# Patient Record
Sex: MALE | Race: WHITE | NOT HISPANIC OR LATINO | Employment: FULL TIME | URBAN - METROPOLITAN AREA
[De-identification: names, ages, dates, MRNs, and addresses within clinical notes are randomized per-mention and may not be internally consistent; named-entity substitution may affect disease eponyms.]

---

## 2024-10-01 ENCOUNTER — OFFICE VISIT (OUTPATIENT)
Dept: URGENT CARE | Age: 54
End: 2024-10-01
Payer: COMMERCIAL

## 2024-10-01 VITALS
HEART RATE: 65 BPM | TEMPERATURE: 98.2 F | SYSTOLIC BLOOD PRESSURE: 152 MMHG | RESPIRATION RATE: 16 BRPM | OXYGEN SATURATION: 98 % | DIASTOLIC BLOOD PRESSURE: 66 MMHG

## 2024-10-01 DIAGNOSIS — K04.7 DENTAL INFECTION: Primary | ICD-10-CM

## 2024-10-01 RX ORDER — CLINDAMYCIN HYDROCHLORIDE 300 MG/1
300 CAPSULE ORAL 3 TIMES DAILY
Qty: 21 CAPSULE | Refills: 0 | Status: SHIPPED | OUTPATIENT
Start: 2024-10-01 | End: 2024-10-08

## 2024-10-01 NOTE — PROGRESS NOTES
Subjective   Patient ID: Donte Grant is a 54 y.o. male. They present today with a chief complaint of Dental Pain (Dental pain since Saturday, concerned for infected wisdom tooth).    History of Present Illness  55 yo male coming in for dental pain to the right upper tooth. He states he needs to have the tooth removed but doesn't go for the consult until Oct. 17th. He states he is having throbbing pain and the gums are swollen. He denies any fevers or chills.     Past Medical History  Allergies as of 10/01/2024 - Reviewed 10/01/2024   Allergen Reaction Noted    Penicillins Other 10/01/2024       (Not in a hospital admission)       History reviewed. No pertinent past medical history.    History reviewed. No pertinent surgical history.         Review of Systems  Review of Systems:  General: No weight loss, fatigue, anorexia, insomnia, fever, chills.  ENT: No pharyngitis, dry mouth, nasal congestion, ear pain. Positive right upper tooth pain  Cardiac: No chest pain, palpitations, syncope, near syncope.  Pulmonary:  No shortness of breath, cough, hemoptysis  Heme/lymph: No swollen glands, fever, bleeding  Musculoskeletal: No limb pain, joint pain, joint swelling.  Skin: No rashes  Neuro: No numbness, tingling, headaches                                 Objective    Vitals:    10/01/24 1541   BP: 152/66   Pulse: 65   Resp: 16   Temp: 36.8 °C (98.2 °F)   SpO2: 98%     No LMP for male patient.    Physical Exam  ENT:  General: Vitals noted, no distress, afebrile. Normal phonation, no stridor, no trismus  ENT:  Tooth number 3 is noted to be broken off with erythema and edema to the gums surrounding the tooth. Posterior oropharynx without erythema, exudate, or swelling. Uvula is in the midline and non-edematous.  Neck: Supple. No meningismus through full range of motion. No lymphadenopathy.   Cardiac: Regular rate and rhythm, no murmur.  Lungs: Good aeration throughout. No adventitious breath sounds.   Skin: No rash  Neuro:  No focal neurologic deficits. NIH score is 0.      Procedures    Point of Care Test & Imaging Results from this visit  No results found for this visit on 10/01/24.   No results found.    Diagnostic study results (if any) were reviewed by HECTOR Pineda.    Assessment/Plan   Allergies, medications, history, and pertinent labs/EKGs/Imaging reviewed by HECTOR Pineda.     Medical Decision Making  Treatment: Clindamycin prescribed  Differential: 1) dental infection, 2) dental abscess, 3) dental pain   Plan: Discussed differential with the pateint. Patient will follow up with the PCP in the next 2-3 days. Return for any worsening symptoms or go to the ER for further evaluation. Patient understands return precautions and discharege insturctions.  Impression:   1) dental infection      Orders and Diagnoses  Diagnoses and all orders for this visit:  Dental infection  -     clindamycin (Cleocin HCL) 300 mg capsule; Take 1 capsule (300 mg) by mouth 3 times a day for 7 days.      Medical Admin Record      Patient disposition: Home    Electronically signed by HECTOR Pineda  3:57 PM

## 2024-10-21 ENCOUNTER — OFFICE VISIT (OUTPATIENT)
Dept: URGENT CARE | Age: 54
End: 2024-10-21
Payer: COMMERCIAL

## 2024-10-21 VITALS
SYSTOLIC BLOOD PRESSURE: 136 MMHG | TEMPERATURE: 97.1 F | RESPIRATION RATE: 17 BRPM | OXYGEN SATURATION: 97 % | HEART RATE: 69 BPM | DIASTOLIC BLOOD PRESSURE: 86 MMHG

## 2024-10-21 DIAGNOSIS — K04.7 TOOTH INFECTION: Primary | ICD-10-CM

## 2024-10-21 PROCEDURE — 99214 OFFICE O/P EST MOD 30 MIN: CPT

## 2024-10-21 RX ORDER — DOXYCYCLINE 100 MG/1
100 CAPSULE ORAL 2 TIMES DAILY
Qty: 20 CAPSULE | Refills: 0 | Status: SHIPPED | OUTPATIENT
Start: 2024-10-21 | End: 2024-10-31

## 2024-10-21 NOTE — PATIENT INSTRUCTIONS
Take full course of antibiotics even though he may be feeling better.     Take medications as directed. Take with food, yogurt, or a probiotic.      I recommend taking a probiotic while taking this medication, and for 7 days after you finish it.      A probiotic is a supplement you can buy over-the-counter that helps support the good bacteria in your body while taking antibiotics. Probiotics help to avoid the side effects of antibiotics, such as diarrhea or yeast infections. It is best to take a probiotic about 2 hours after your dose of antibiotic.      Take choice of NSAID, may take Tylenol 3 to 4 hours after administration if needed    If significant pain or fevers please go to the ER.

## 2024-10-22 ASSESSMENT — ENCOUNTER SYMPTOMS: CONSTITUTIONAL NEGATIVE: 1

## 2024-10-22 NOTE — PROGRESS NOTES
Subjective   Patient ID: Donte Grant is a 54 y.o. male. They present today with a chief complaint of Dental Pain (Upper right side, tooth broke and is infected. Pt was seen here 10/1 and was given antibiotics. Toothache has returned and pt dentist appointment isn't until 11/19. ).    History of Present Illness  54-year-old male presents to clinic today with complaints of right upper dental pain.  Patient states that he has a tooth that has been cracked multiple times.  He states that he has seen his dentist along with his periodontist.  Patient was then referred to an oral surgeon and does need a tooth removed.  He states that is not till next month.  He was seen here a couple weeks ago due to increased pain in that tooth.  He was placed on clindamycin.  He states he finished that.  Patient states over the last couple days he has developed pain in that area again.  He has been taking aspirin for it.  Denies fevers.  Denies body aches or chills.      Dental Pain      Past Medical History  Allergies as of 10/21/2024 - Reviewed 10/21/2024   Allergen Reaction Noted    Penicillins Other 10/01/2024       (Not in a hospital admission)       No past medical history on file.    No past surgical history on file.         Review of Systems  Review of Systems   Constitutional: Negative.    HENT:  Positive for dental problem.                                   Objective    Vitals:    10/21/24 1536   BP: 136/86   BP Location: Left arm   Patient Position: Sitting   BP Cuff Size: Adult   Pulse: 69   Resp: 17   Temp: 36.2 °C (97.1 °F)   SpO2: 97%     No LMP for male patient.    Physical Exam  Constitutional:       Appearance: Normal appearance.   HENT:      Mouth/Throat:      Mouth: Mucous membranes are moist.      Dentition: Abnormal dentition. Dental tenderness and dental caries present. No dental abscesses.   Neurological:      Mental Status: He is alert.         Procedures    Point of Care Test & Imaging Results from this  visit  No results found for this visit on 10/21/24.   No results found.    Diagnostic study results (if any) were reviewed by Steve Peacock PA-C.    Assessment/Plan   Allergies, medications, history, and pertinent labs/EKGs/Imaging reviewed by Steve Peacock PA-C.     Medical Decision Making  There is a cracked tooth to the upper right molar.  Multiple teeth in poor health.  No abscess noted.  Discussed with patient possible antibiotics to proceed with.  We did ultimately decide to go with doxycycline.  I advised him to contact his dentist and let him know how much trouble he is having with his tooth because he is not.  If anything worsen please go to the emergency room.    Orders and Diagnoses  Diagnoses and all orders for this visit:  Tooth infection  -     doxycycline (Vibramycin) 100 mg capsule; Take 1 capsule (100 mg) by mouth 2 times a day for 10 days. Take with at least 8 ounces (large glass) of water, do not lie down for 30 minutes after      Medical Admin Record      Patient disposition: Home    Electronically signed by Steve Peacock PA-C  1:16 AM

## 2025-05-15 ENCOUNTER — OFFICE VISIT (OUTPATIENT)
Dept: URGENT CARE | Age: 55
End: 2025-05-15
Payer: COMMERCIAL

## 2025-05-15 VITALS
SYSTOLIC BLOOD PRESSURE: 134 MMHG | RESPIRATION RATE: 16 BRPM | OXYGEN SATURATION: 98 % | DIASTOLIC BLOOD PRESSURE: 88 MMHG | TEMPERATURE: 97.6 F | HEART RATE: 86 BPM

## 2025-05-15 DIAGNOSIS — J32.0 MAXILLARY SINUSITIS, UNSPECIFIED CHRONICITY: Primary | ICD-10-CM

## 2025-05-15 DIAGNOSIS — Z20.822 SUSPECTED COVID-19 VIRUS INFECTION: ICD-10-CM

## 2025-05-15 LAB
POC CORONAVIRUS SARS-COV-2 PCR: NEGATIVE
POC HUMAN RHINOVIRUS PCR: NEGATIVE
POC INFLUENZA A VIRUS PCR: NEGATIVE
POC INFLUENZA B VIRUS PCR: NEGATIVE
POC RESPIRATORY SYNCYTIAL VIRUS PCR: NEGATIVE

## 2025-05-15 RX ORDER — AZITHROMYCIN 250 MG/1
TABLET, FILM COATED ORAL
Qty: 6 TABLET | Refills: 0 | Status: SHIPPED | OUTPATIENT
Start: 2025-05-15 | End: 2025-05-20

## 2025-05-15 ASSESSMENT — ENCOUNTER SYMPTOMS
COUGH: 1
SORE THROAT: 1
SINUS PRESSURE: 1
EYES NEGATIVE: 1
FATIGUE: 1
SINUS PAIN: 1
ACTIVITY CHANGE: 1

## 2025-05-15 NOTE — PROGRESS NOTES
Subjective   Patient ID: Donte Grant is a 54 y.o. male. They present today with a chief complaint of cough, congestion.    History of Present Illness  [facility] 54-year-old male arrives to clinic with chief complaint of cough, congestion, sinus drainage over the last 4 days.  Patient states that when he started riding his motorcycle, he started increasing in coughing, congestion and sinus pressure.  He has tried over-the-counter Aleve and generic antihistamines with minimal relief.  He is here for evaluation.    Past Medical History  Allergies as of 05/15/2025 - Reviewed 05/15/2025   Allergen Reaction Noted    Penicillins Other 10/01/2024       Prescriptions Prior to Admission[1]     Medical History[2]    Surgical History[3]         Review of Systems  Review of Systems   Constitutional:  Positive for activity change and fatigue.   HENT:  Positive for congestion, sinus pressure, sinus pain and sore throat.    Eyes: Negative.    Respiratory:  Positive for cough.                                   Objective    Vitals:    05/15/25 1442   BP: 134/88   Pulse: 86   Resp: 16   Temp: 36.4 °C (97.6 °F)   SpO2: 98%     No LMP for male patient.    Physical Exam  Erythematous pharynx  Procedures    Point of Care Test & Imaging Results from this visit  Results for orders placed or performed in visit on 05/15/25   POCT SPOTFIRE R/ST Panel Mini w/COVID ([facility]) manually resulted    Specimen: Swab   Result Value Ref Range    POC Sars-Cov-2 PCR Negative Negative    POC Respiratory Syncytial Virus PCR Negative Negative    POC Influenza A Virus PCR Negative Negative    POC Influenza B Virus PCR Negative Negative    POC Human Rhinovirus PCR Negative Negative      Imaging  No results found.    Cardiology, Vascular, and Other Imaging  No other imaging results found for the past 2 days      Diagnostic study results (if any) were reviewed by HECTOR Ahn.    Assessment/Plan   Allergies, medications, history, and pertinent  labs/EKGs/Imaging reviewed by HECTOR Ahn.     Medical Decision Making  Physical examination is essentially benign other than the patient's erythematous pharynx.  Spot fire COVID test was ordered in which it was negative.  Symptoms are likely viral sinusitis given the abrupt onset.  I recommend over-the-counter Zyrtec and Flonase.  I did send the patient home with a Z-Hitesh that he can  today and hold for 7 days.  If symptoms are still present despite using Zyrtec, Flonase, Delsym, may use a Z-Hitesh at that time.  The patient agrees to the plan of care was discharged stable condition.    As a result of the work-up, the patient was discharged home.  he was informed of his diagnosis and instructed to come back with any concerns or worsening of condition.  he and was agreeable to the plan as discussed above.  he was given the opportunity to ask questions.  All of the patient's questions were answered.    This document was generated using the assistance of voice recognition software. If there are any errors of spelling, grammar, syntax, or meaning; please feel free to contact me directly for clarification.     Orders and Diagnoses  Diagnoses and all orders for this visit:  Maxillary sinusitis, unspecified chronicity  -     azithromycin (Zithromax) 250 mg tablet; Take 2 tabs (500 mg) by mouth today, than 1 daily for 4 days.  Suspected COVID-19 virus infection  -     POCT SPOTFIRE R/ST Panel Mini w/COVID ([facility]) manually resulted  -     azithromycin (Zithromax) 250 mg tablet; Take 2 tabs (500 mg) by mouth today, than 1 daily for 4 days.      Medical Admin Record      Patient disposition: Home    Electronically signed by HECTOR Ahn  3:16 PM           [1] (Not in a hospital admission)   [2] No past medical history on file.  [3] No past surgical history on file.

## 2025-05-15 NOTE — PATIENT INSTRUCTIONS
Please use any OTC generic antihistamine, Delsym, and Flonase.    I did send over a Z-pack for you. Follow the directions on the box!

## 2025-07-01 ENCOUNTER — HOSPITAL ENCOUNTER (OUTPATIENT)
Dept: RADIOLOGY | Facility: CLINIC | Age: 55
Discharge: HOME | End: 2025-07-01
Payer: COMMERCIAL

## 2025-07-01 ENCOUNTER — APPOINTMENT (OUTPATIENT)
Dept: ORTHOPEDIC SURGERY | Facility: CLINIC | Age: 55
End: 2025-07-01
Payer: COMMERCIAL

## 2025-07-01 VITALS — WEIGHT: 230 LBS | HEIGHT: 72 IN | BODY MASS INDEX: 31.15 KG/M2

## 2025-07-01 DIAGNOSIS — M25.561 RIGHT KNEE PAIN, UNSPECIFIED CHRONICITY: ICD-10-CM

## 2025-07-01 PROCEDURE — 73564 X-RAY EXAM KNEE 4 OR MORE: CPT | Mod: RT

## 2025-07-01 PROCEDURE — 99203 OFFICE O/P NEW LOW 30 MIN: CPT | Performed by: ORTHOPAEDIC SURGERY

## 2025-07-01 PROCEDURE — 73564 X-RAY EXAM KNEE 4 OR MORE: CPT | Mod: RIGHT SIDE | Performed by: RADIOLOGY

## 2025-07-01 PROCEDURE — 3008F BODY MASS INDEX DOCD: CPT | Performed by: ORTHOPAEDIC SURGERY

## 2025-07-01 ASSESSMENT — PAIN SCALES - GENERAL: PAINLEVEL_OUTOF10: 3

## 2025-07-01 ASSESSMENT — PAIN - FUNCTIONAL ASSESSMENT: PAIN_FUNCTIONAL_ASSESSMENT: 0-10

## 2025-07-01 NOTE — PATIENT INSTRUCTIONS
BMI was above normal measurement. Current weight: 104 kg (230 lb)  Weight change since last visit (-) denotes wt loss 230 lbs   Weight loss needed to achieve BMI 25: 46.1 Lbs  Weight loss needed to achieve BMI 30: 9.3 Lbs  Provided instructions on exercise  Advised to Increase physical activity.

## 2025-07-01 NOTE — PROGRESS NOTES
PRIMARY CARE PHYSICIAN: Lewis Stern MD (Inactive)  REFERRING PROVIDER: No referring provider defined for this encounter.     CONSULT ORTHOPAEDIC: Knee Evaluation        ASSESSMENT & PLAN    IMPRESSION:   History of right knee ACL-R, 2002    PLAN:   Discussed the patient's diagnosis above.  Reviewed his current x-rays with him.  Interference screws in place from ACL reconstruction and overall has mild medial, arthritis with some irregularity at the medial femoral condyle and this could potentially have caused some of his symptoms of pain in his knee.  He has no signs or symptoms of prosthetic joint infection and his effusion could just be some underlying synovitis in his knee.  Given that he is generally improving recommended to gradually improve his activities and trying some oral over-the-counter anti-inflammatory medications for temporary period of time to see if this helps his symptoms and if he fails to improve may follow-up for reevaluation but at this point do not see any need for advanced imaging or additional intervention at this time.      SUBJECTIVE  CHIEF COMPLAINT:   Chief Complaint   Patient presents with    Right Knee - Pain        HPI: Donte Grant is a 54 y.o. patient. Donte Grant has had progressive problems with their right knee over the past 2-3 weeks. They do not report any history of trauma to the area(s). They deny having any numbness and tingling in their leg. Their symptoms that are interfering with their daily life include pain and tenderness. Started after doing an exercise program. Did not have pain at the time but then got sick and knee got swollen and painful after. Worse with movement. XR done today. Overall getting a little bit better but still limping.     FUNCTIONAL STATUS: occasionally limited.  AMBULATORY STATUS: Independent  PREVIOUS TREATMENTS: None  HISTORY OF SURGERY ON AFFECTED KNEE(S): Yes torn ACL in 2002  PREVIOUS NOTES REVIEWED: None to review      REVIEW  OF SYSTEMS  Constitutional: See HPI for pain assessment, No significant weight loss, recent trauma  Cardiovascular: No chest pain, shortness of breath  Respiratory: No difficulty breathing, cough  Gastrointestinal: No nausea, vomiting, diarrhea, constipation  Musculoskeletal: Noted in HPI, positive for pain, restricted motion, stiffness  Integumentary: No rashes, easy bruising, redness   Neurological: no numbness or tingling in extremities, no gait disturbances   Psychiatric: No mood changes, memory changes, social issues  Heme/Lymph: no excessive swelling, easy bruising, excessive bleeding  ENT: No hearing changes  Eyes: No vision changes    Medical History[1]     Allergies[2]     Surgical History[3]     Family History[4]     Social History     Socioeconomic History    Marital status:      Spouse name: Not on file    Number of children: Not on file    Years of education: Not on file    Highest education level: Not on file   Occupational History    Not on file   Tobacco Use    Smoking status: Unknown    Smokeless tobacco: Not on file   Substance and Sexual Activity    Alcohol use: Not on file    Drug use: Not on file    Sexual activity: Not on file   Other Topics Concern    Not on file   Social History Narrative    Not on file     Social Drivers of Health     Financial Resource Strain: Low Risk  (8/26/2024)    Received from "[employer]    Overall Financial Resource Strain (CARDIA)     Difficulty of Paying Living Expenses: Not hard at all   Food Insecurity: No Food Insecurity (8/26/2024)    Received from "[employer]    Hunger Vital Sign     Worried About Running Out of Food in the Last Year: Never true     Ran Out of Food in the Last Year: Never true   Transportation Needs: No Transportation Needs (8/26/2024)    Received from "[employer]    PRAPARE - Transportation     Lack of Transportation (Medical): No     Lack of Transportation (Non-Medical): No   Physical Activity: Not on file   Stress: Not on file    Social Connections: Not on file   Intimate Partner Violence: Not on file   Housing Stability: Not on file        CURRENT MEDICATIONS:   Current Medications[5]     OBJECTIVE    PHYSICAL EXAM      10/1/2024     3:41 PM 10/21/2024     3:36 PM 5/15/2025     2:42 PM   Vitals   Systolic 152 136 134   Diastolic 66 86 88   BP Location  Left arm    Heart Rate 65 69 86   Temp 36.8 °C (98.2 °F) 36.2 °C (97.1 °F) 36.4 °C (97.6 °F)   Resp 16 17 16   Visit Report Report Report Report      There is no height or weight on file to calculate BMI.    GENERAL: A/Ox3, NAD. Appears healthy, well nourished  PSYCHIATRIC: Mood stable, appropriate memory recall  EYES: EOM intact, no scleral icterus  CARDIAC: regular rate  LUNGS: Breathing non-labored  SKIN: no erythema, rashes, or ecchymoses     MUSCULOSKELETAL:  Laterality: right Knee Exam  - Previous surgical incisions are well-healed  - Alignment: Neutral  - ROM: Nonpainful motion from 0 to 120 degrees  - Effusion: 2+  - Strength: knee extension and flexion 5/5, EHL/PF/DF motor intact  - Palpation: Mild medial joint line tenderness  - Stability: Anterior/Posterior stable, varus/valgus stable  - Gait: normal  - Hip Exam: flexion to 100+ degrees, full extension, internal/external rotation adequate, and no pain with log roll  - Special Tests: Negative Lachman      NEUROVASCULAR:  - Neurovascular Status: sensation intact to light touch distally  - Capillary refill brisk at extremities, Bilateral dorsalis pedis pulse 2+     IMAGING:  Multiple views of the affected right knee(s) demonstrate: Mild medial compartment arthritis with joint space narrowing subchondral sclerosis and slight irregularity noted at the margin of the medial femoral condyle.   X-rays were personally reviewed and interpreted by me.  Radiology reports were reviewed by me as well, if readily available at the time.    ** Voice Recognition software was used to dictate this note. Please be aware that minor errors in the  transcription may be present **    Leigh Minor, DO  Attending Surgeon  Joint Replacement and Adult Reconstructive Surgery  [place], [place]          [1] No past medical history on file.  [2]   Allergies  Allergen Reactions    Penicillins Other   [3] No past surgical history on file.  [4] No family history on file.  [5]   No current outpatient medications on file.     No current facility-administered medications for this visit.